# Patient Record
(demographics unavailable — no encounter records)

---

## 2024-11-25 NOTE — ASSESSMENT
[FreeTextEntry1] : 45 year old female with acute low back pain improving with PT C/w PT transition to [facility] OTC NISADS PRN.  continue to wean FU PRN

## 2024-11-25 NOTE — IMAGING
[Facet arthropathy] : Facet arthropathy [Disc space narrowing] : Disc space narrowing [Spondylolithesis] : Spondylolithesis [No instability seen on flexion/extension] : No instability seen on flexion/extension [FreeTextEntry1] : slight l5-s1 spondylolisthesis

## 2024-11-25 NOTE — HISTORY OF PRESENT ILLNESS
[Lower back] : lower back [2] : 2 [0] : 0 [de-identified] : 11/25/2024: Follow up visit today. Continuing Pt, going well. Would like new PT rx today.  08/26/2024: 45-year-old female presents today with complaints of acute low back pain with No known injury/fall. Experiencing sharp/shooting and tight spams with ROM -flexion since 8/26/24. taking advil prn. Patient has Hx of low back and with bilateral foot numbness since 8/26/24. managed with SI joint injections and PT.  PmHx: Migraines (tx with botox and Ajovy inj), Long covid asthma NKDA Occupation: Assistant - full time [] : no

## 2024-11-26 NOTE — ASSESSMENT
[FreeTextEntry1] : Imaging was reviewed and independently interpreted [facility] - deg changes, prepatella butrsitis, eff  Right X-Ray Examination of the KNEE (4 views): medial and patellofemoral degenerate changes.   - We discussed their diagnosis and treatment options at length including the risks and benefits of both surgical treatment with a knee replacement and non-surgical options. Surgical risks include but are not limited to pain, infection, bleeding, vascular injury, numbness, tingling, nerve damage. - Due to risks of surgery, they will continue conservative treatment with PT, icing, and anti-inflammatory medications - The patient was provided with a PT prescription to work on ROM, hip ER/abductors strengthening, quad/hamstring stretches and strengthening, and other exercises - The patient was advised to let pain guide the gradual advancement of activities. - The patient was advised to apply ice (wrapped in a towel or protective covering) to the area daily (20 minutes at a time, 2-4X/day). - We also discussed the possible of a corticosteroid injection in order to help decrease inflammation and pain so that they can perform better therapy. - The risks, benefits, and alternatives to corticosteroid injection were reviewed with the patient and they wished to proceed with this treatment course. - Follow up as needed in 6 weeks to re-evaluate, if no improvement we spoke about possibility of viscosupplementation injections    Medication Discussion: 1) We discussed a comprehensive treatment plan that included possible pharmaceutical management involving the use of prescription strength medications including but not limited to options such as oral Naprosyn 500mg BID, once daily Meloxicam 15 mg, or 500-650 mg Tylenol versus over the counter oral medications in addition to discussing possible topical prescription Pennsaid vs  Voltaren gel. 2) There is a moderate risk of morbidity with further treatment, especially from use of prescription strength medications and possible side effects of these medications which include but are not limited to upset stomach with oral medications, skin reactions to topical medications and GI/cardiac/renal issues with long term use. 3) I recommended that the patient follow-up with their medical physician if there are any significant potential issues with long term medication use such as interactions with current medications or with exacerbation of underlying medical comorbidities. 4) The benefits and risks associated with use of oral and / or topical prescription and over the counter anti-inflammatory medications were discussed with the patient. The patient voiced understanding of the risks including but not limited to bleeding, stroke, kidney dysfunction, heart disease, and were referred to the black box warning label for further information.

## 2024-11-26 NOTE — IMAGING
[de-identified] :  RIGHT KNEE Inspection:  minimal effusion Palpation: medial facet of the patella tenderness  Knee Range of Motion:  0-135 Strength: 5/5 Quadriceps strength, 5/5 Hamstring strength, 4/5 Hip Abductor strength Neurological: light touch is intact throughout Ligament Stability and Special Tests:  McMurrays: neg Lachman: neg Pivot Shift: neg Posterior Drawer: neg Valgus: neg Varus: neg Patella Apprehension: neg Patella Maltracking: neg

## 2024-11-26 NOTE — HISTORY OF PRESENT ILLNESS
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 Saw Dr. Lyle at [facility] for  MRI at [facility] 5/19/23  The pain is located anterior and medial and dep The pain is associated with swelling, weakness, buckling  Worse with activity and better at rest. Has tried activity modification